# Patient Record
Sex: FEMALE | Race: WHITE | NOT HISPANIC OR LATINO | URBAN - METROPOLITAN AREA
[De-identification: names, ages, dates, MRNs, and addresses within clinical notes are randomized per-mention and may not be internally consistent; named-entity substitution may affect disease eponyms.]

---

## 2018-01-21 ENCOUNTER — EMERGENCY (EMERGENCY)
Facility: HOSPITAL | Age: 38
LOS: 1 days | Discharge: HOME | End: 2018-01-21
Attending: EMERGENCY MEDICINE | Admitting: EMERGENCY MEDICINE
Payer: COMMERCIAL

## 2018-01-21 VITALS
DIASTOLIC BLOOD PRESSURE: 77 MMHG | SYSTOLIC BLOOD PRESSURE: 124 MMHG | HEART RATE: 98 BPM | RESPIRATION RATE: 17 BRPM | TEMPERATURE: 99 F | OXYGEN SATURATION: 98 %

## 2018-01-21 VITALS
WEIGHT: 195.11 LBS | RESPIRATION RATE: 18 BRPM | DIASTOLIC BLOOD PRESSURE: 77 MMHG | TEMPERATURE: 99 F | OXYGEN SATURATION: 97 % | SYSTOLIC BLOOD PRESSURE: 131 MMHG | HEIGHT: 67 IN | HEART RATE: 97 BPM

## 2018-01-21 DIAGNOSIS — Z98.890 OTHER SPECIFIED POSTPROCEDURAL STATES: Chronic | ICD-10-CM

## 2018-01-21 DIAGNOSIS — Z87.19 PERSONAL HISTORY OF OTHER DISEASES OF THE DIGESTIVE SYSTEM: Chronic | ICD-10-CM

## 2018-01-21 DIAGNOSIS — Z98.891 HISTORY OF UTERINE SCAR FROM PREVIOUS SURGERY: Chronic | ICD-10-CM

## 2018-01-21 PROCEDURE — 99284 EMERGENCY DEPT VISIT MOD MDM: CPT

## 2018-01-21 RX ORDER — DIAZEPAM 5 MG
5 TABLET ORAL ONCE
Qty: 0 | Refills: 0 | Status: DISCONTINUED | OUTPATIENT
Start: 2018-01-21 | End: 2018-01-21

## 2018-01-21 RX ORDER — OXYCODONE AND ACETAMINOPHEN 5; 325 MG/1; MG/1
1 TABLET ORAL ONCE
Qty: 0 | Refills: 0 | Status: DISCONTINUED | OUTPATIENT
Start: 2018-01-21 | End: 2018-01-21

## 2018-01-21 RX ORDER — DIAZEPAM 5 MG
1 TABLET ORAL
Qty: 12 | Refills: 0 | OUTPATIENT
Start: 2018-01-21 | End: 2018-01-24

## 2018-01-21 RX ADMIN — Medication 5 MILLIGRAM(S): at 17:49

## 2018-01-21 RX ADMIN — Medication 40 MILLIGRAM(S): at 17:49

## 2018-01-21 RX ADMIN — OXYCODONE AND ACETAMINOPHEN 1 TABLET(S): 5; 325 TABLET ORAL at 17:49

## 2018-01-21 NOTE — ED PROVIDER NOTE - CARE PLAN
Principal Discharge DX:	Acute left-sided low back pain with left-sided sciatica  Assessment and plan of treatment:	Return to the ED for any new or worsening symptoms  Take your medication as prescribed  Valium per label instructions as needed for pain/spasm do not drive when taking  Percocet per label instructions as needed for severe do not drive when taking  Prednisone 1 tab daily begin tomorrow as you had your first dose here   Follow up on Tuesday as scheduled   Advance activity as tolerated  Heating pad to affected sore region on 20 min off 40 min as needed for pain and spasm  Secondary Diagnosis:	Lumbar disc herniation

## 2018-01-21 NOTE — ED PROVIDER NOTE - OBJECTIVE STATEMENT
Pt is a 36 yo female who presents to the ED with a cc of back pain.  PMHx of HTN, lumbar disc herniation.  Pt reports that she has been suffering from chronic low back pain for the last several months.  Denies any acute trauma.  She followed up and had an outpatient MRI preformed approx 1 week ago.  She was told that she had a lumbar disc herniation and has follow up with a specialist on Tuesday.  She is not currently taking any medication for this.  Yesterday she admits to walking for a prolonged period of time but denies any falls or trauma. This morning while getting up from bed she felt a severe pain in her left lower back/gluteal region.  She reports that the pain is sharp and stabbing in nature with radiation down her left leg into her toes.  She reports a tingling sensation in her left leg.  Denies numbness in the leg.  The pain is so severe she cannot lay or sit which is not usual for her.  Pt has not taken anything for the symptoms.  Denies fever, chills, N/V, CP, SOB, abd pain.  Denies loss of bowel or bladder function.  Denies dysuria, urinary frequency or urgency.    LMP 2 weeks ago denies chance of pregnancy

## 2018-01-21 NOTE — ED ADULT NURSE NOTE - OBJECTIVE STATEMENT
Presents to ER w back pain. Pt states she had an MRI 1 week ago and was told she has bulging discs. Pt has tingling in left foot and numbness down the calf.

## 2018-01-21 NOTE — ED PROVIDER NOTE - PLAN OF CARE
Return to the ED for any new or worsening symptoms  Take your medication as prescribed  Valium per label instructions as needed for pain/spasm do not drive when taking  Percocet per label instructions as needed for severe do not drive when taking  Prednisone 1 tab daily begin tomorrow as you had your first dose here   Follow up on Tuesday as scheduled   Advance activity as tolerated  Heating pad to affected sore region on 20 min off 40 min as needed for pain and spasm

## 2018-01-21 NOTE — ED PROVIDER NOTE - PROGRESS NOTE DETAILS
Pt with improvement in symptoms.  All questions answered.  Stable for discharge home with outpatient follow up.  She has appt on Tuesday

## 2018-01-21 NOTE — ED PROVIDER NOTE - MUSCULOSKELETAL, MLM
No midline C/T/L TTP step offs or deformities noted TTP overlying left gluteal region with increased tone noted to left lower lumbar paraspinal region sensation intact to bilateral lower ext 5/5 muscle strength +pedal pulses bilaterally

## 2019-07-17 NOTE — ED ADULT NURSE NOTE - NS ED NURSE LEVEL OF CONSCIOUSNESS MENTAL STATUS
Alb 4.5 3.5 - 5.2 g/dL    Albumin/Globulin Ratio 2.3 1.0 - 2.5    GFR Non-African American >60 >60 mL/min    GFR African American >60 >60 mL/min    GFR Comment          GFR Staging NOT REPORTED    CBC Auto Differential   Result Value Ref Range    WBC 49.6 (HH) 3.5 - 11.0 k/uL    RBC 4.14 4.0 - 5.2 m/uL    Hemoglobin 14.0 12.0 - 16.0 g/dL    Hematocrit 41.7 36 - 46 %    .9 (H) 80 - 100 fL    MCH 33.8 26 - 34 pg    MCHC 33.5 31 - 37 g/dL    RDW 14.6 12.5 - 15.4 %    Platelets 088 (L) 996 - 450 k/uL    MPV 7.2 6.0 - 12.0 fL    NRBC Automated NOT REPORTED per 100 WBC    Differential Type NOT REPORTED     Immature Granulocytes NOT REPORTED 0 %    Absolute Immature Granulocyte NOT REPORTED 0.00 - 0.30 k/uL    WBC Morphology NOT REPORTED     RBC Morphology NOT REPORTED     Platelet Estimate NOT REPORTED     Seg Neutrophils 4 (L) 36 - 66 %    Lymphocytes 95 (H) 24 - 44 %    Monocytes 1 1 - 7 %    Eosinophils % 0 (L) 1 - 4 %    Basophils 0 0 - 2 %    Segs Absolute 1.98 1.8 - 7.7 k/uL    Absolute Lymph # 47.12 (H) 1.0 - 4.8 k/uL    Absolute Mono # 0.50 0.1 - 0.8 k/uL    Absolute Eos # 0.00 0.0 - 0.4 k/uL    Basophils # 0.00 0.0 - 0.2 k/uL    Morphology SMUDGE CELLS PRESENT        IMPRESSION:     1. CLL (chronic lymphocytic leukemia) ([facility] [address]    2. Thrombocytopenia ([facility] [address]        Patient Active Problem List   Diagnosis    Mixed hyperlipidemia    Hypothyroidism    Essential hypertension    CLL (chronic lymphocytic leukemia) (HCC)    History of MRSA infection       PLAN:     1. Reviewed the patient's labs. Specifically, we had an in-depth discussion regarding her CBC. I am a little concerned about the findings. Specifically, her white count and absolute lymphocyte count did rise. However, her platelet count slightly dropped. This is a concern. 2. She is otherwise asymptomatic. We will hold off on initiating any treatment for the moment. However, we will plan to reassess labs in 3 months for surveillance.   3. She Awake/Alert will monitor herself closely and alert me of any changes in her condition. Otherwise, she will return to see me in 3 months or sooner if clinically indicated.       Electronically signed by Randell Jain MD on 7/17/2019 at 6:27 PM Awake/Alert/Cooperative

## 2023-02-21 PROBLEM — Z00.00 ENCOUNTER FOR PREVENTIVE HEALTH EXAMINATION: Status: ACTIVE | Noted: 2023-02-21

## 2023-02-21 PROBLEM — I10 ESSENTIAL (PRIMARY) HYPERTENSION: Chronic | Status: ACTIVE | Noted: 2018-01-21

## 2023-02-24 ENCOUNTER — APPOINTMENT (OUTPATIENT)
Dept: OTOLARYNGOLOGY | Facility: CLINIC | Age: 43
End: 2023-02-24
Payer: COMMERCIAL

## 2023-02-24 VITALS
HEIGHT: 67 IN | DIASTOLIC BLOOD PRESSURE: 104 MMHG | SYSTOLIC BLOOD PRESSURE: 153 MMHG | BODY MASS INDEX: 32.18 KG/M2 | WEIGHT: 205 LBS

## 2023-02-24 DIAGNOSIS — Z86.79 PERSONAL HISTORY OF OTHER DISEASES OF THE CIRCULATORY SYSTEM: ICD-10-CM

## 2023-02-24 DIAGNOSIS — Z83.3 FAMILY HISTORY OF DIABETES MELLITUS: ICD-10-CM

## 2023-02-24 DIAGNOSIS — Z78.9 OTHER SPECIFIED HEALTH STATUS: ICD-10-CM

## 2023-02-24 DIAGNOSIS — Z82.49 FAMILY HISTORY OF ISCHEMIC HEART DISEASE AND OTHER DISEASES OF THE CIRCULATORY SYSTEM: ICD-10-CM

## 2023-02-24 PROCEDURE — 31579 LARYNGOSCOPY TELESCOPIC: CPT

## 2023-02-24 PROCEDURE — 99204 OFFICE O/P NEW MOD 45 MIN: CPT | Mod: 25

## 2023-02-24 RX ORDER — ATENOLOL 50 MG/1
TABLET ORAL
Refills: 0 | Status: ACTIVE | COMMUNITY

## 2023-02-24 NOTE — CONSULT LETTER
[Dear  ___] : Dear  [unfilled], [Consult Letter:] : I had the pleasure of evaluating your patient, [unfilled]. [Please see my note below.] : Please see my note below. [Consult Closing:] : Thank you very much for allowing me to participate in the care of this patient.  If you have any questions, please do not hesitate to contact me. [Sincerely,] : Sincerely, [FreeTextEntry2] : Dr. Dionte Hunteroscia \par [address] [place] [address]\par [phone] [FreeTextEntry1] : Thanks for the referal.  We will plan on doing an in office injection laryngoplasty approx 1 week from now. [FreeTextEntry3] : Tyler Arriola M.D.\par Division of Laryngology | Department of Otolaryngology\par [facility]\[place] [id number]

## 2023-02-24 NOTE — ASSESSMENT
[FreeTextEntry1] : Assessment/Plan: \par After a thorough discussion of our findings today, the patient understands she has an idiopathic left vocal fold paralysis, left vocal fold polyp, and right vocal fold lesion.  I will request the outside CT neck to review and confirm lack of cause of paralysis.  We discussed doing an in office injection laryngoplasty of the left vocal fold in 1 week which she would like to proceed with.  The risks, benefits, and alternatives to care were discussed with the patient and understanding expressed. No need for SLP at this time, may consider in the future.  Continue voice amplifier as needed.\par \par

## 2023-02-24 NOTE — HISTORY OF PRESENT ILLNESS
[de-identified] : RICARDO DODD is a 42 year old woman who presents to the [facility] with difficulty phonating and known left sided vocal fold paralysis. Patient is referred by Dr. Guerra. VF paralysis first diagnosed 3 weeks ago.  Illness was 6 weeks ago, and voice quit 5 days after that suddenly. Currently using an amplifier for work. States he has had mild persistent hoarseness for many years prior to this recent sudden worsening of voice.\par \par States had a cold, went to urgent care and was tested for strep with positive results, treated with 5 days antibiotics and steroid taper, hoarseness started after.  \par Denies complete loss of voice.\par Denies hemoptysis. \par She does not note specific difficulties with swallowing.\par She notes intermittent classic heartburn symptoms- not taking medications for it.\par Denies odynophagia, aspirations, dyspnea, otalgia. \par Denies history of smoking. States social alcohol use. 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

## 2023-02-24 NOTE — PROCEDURE
[de-identified] : Procedure: Transnasal flexible laryngoscopy \par Description: Informed consent was obtained from the patient prior to the procedure. The patient was seated in the clinic chair. Topical anesthesia was achieved by first spraying the nasal cavities with 4% lidocaine and 1% phenylephrine. \par \par Exam: This demonstrates a clear vallecula and crisp epiglottis. The aryepiglottic folds are intact and symmetric bilaterally. The hypopharynx does not demonstrate pooling. The interarytenoid space demonstrates no lesions. It does not demonstrate pachydermia. The false vocal folds are symmetric and without lesions or masses. False fold voicing (plica ventricularis) is not noted today. The right true vocal fold shows normal motion. The left true vocal fold shows absence of mobility and is in the paramedian position. The left vocal fold shows what appears to be a small broad based polyp vs pseudocyst along the mid fold and right side with contralateral small reactive lesion. The mucosal covering is minimally edematous. There is not erythema present today. The vocal processes do not demonstrate granulomas. The subglottis and proximal trachea is clear and unobstructed to the limits of the examination today. There is substantial supraglottic muscle tension on phonation.\par \par Stroboscopic Laryngoscopy Procedure Note: \par Indication:	Assess laryngeal biomechanics and vocal fold oscillation. \par Description of Procedure:	Informed consent was verbally obtained from the patient prior to the procedure. The patient was seated in the clinic chair. Topical anesthesia was achieved by first spraying the nasal cavities with 4% lidocaine and nasal decongestant. \par \par Findings: \par Supraglottis: no masses or lesions \par Glottis:    Structure: \par                       Right: small mid fold reactive lesion vs fibroepithelial lesion \par                       Left:  shows what appears to be a small broad based polyp vs pseudocyst along the mid fold \par                Mobility: \par                       Right:  normal \par                       Left:  absent\par                Amplitude: \par                       Right:  normal\par                       Left:  increased\par                Closure: complete \par                Wave symmetry:  symmetric at most frequencies \par Subglottis: no masses or lesions within the visualized subglottis Visualized airway is widely patent.

## 2023-03-16 ENCOUNTER — APPOINTMENT (OUTPATIENT)
Dept: OTOLARYNGOLOGY | Facility: CLINIC | Age: 43
End: 2023-03-16
Payer: COMMERCIAL

## 2023-03-16 DIAGNOSIS — J38.01 PARALYSIS OF VOCAL CORDS AND LARYNX, UNILATERAL: ICD-10-CM

## 2023-03-16 PROCEDURE — ZZZZZ: CPT

## 2023-03-16 NOTE — CONSULT LETTER
[Dear  ___] : Dear  [unfilled], [Consult Letter:] : I had the pleasure of evaluating your patient, [unfilled]. [Please see my note below.] : Please see my note below. [Consult Closing:] : Thank you very much for allowing me to participate in the care of this patient.  If you have any questions, please do not hesitate to contact me. [Sincerely,] : Sincerely, [FreeTextEntry2] : Dr. Guerra [FreeTextEntry1] : She underwent an in office injection laryngoplasty with me today.  She will now undergo voice therapy.  I will see her back in 3 months for her vocal fold lesions.  I will have her follow up with you for all other ENT needs. Thanks again for referral. [FreeTextEntry3] : Tyler Arriola M.D.\par Division of Laryngology | Department of Otolaryngology \par [facility] \[place] [phone]

## 2023-03-16 NOTE — HISTORY OF PRESENT ILLNESS
[de-identified] : RICARDO DODD is a 42 year old woman who presents to the [facility] with idiopathic left vocal fold paralysis, left vocal fold polyp, and right vocal fold lesion. I last saw the patient on 2/24/23.  At that time we requested her outside CT neck for review and planned for in office injection laryngoplasty. Reports voice is mildly improved from last visit. Again admits that her voice has been hoarse her whole life.  \par \par Previously reported: Patient is referred by Dr. Guerra. VF paralysis first diagnosed 3 weeks ago.  Illness was 6 weeks ago, and voice quit 5 days after that suddenly. Currently using an amplifier for work. States he has had mild persistent hoarseness for many years prior to this recent sudden worsening of voice. States had a cold, went to urgent care and was tested for strep with positive results, treated with 5 days antibiotics and steroid taper, hoarseness started after.  \par Denies complete loss of voice.\par Denies hemoptysis. \par She does not note specific difficulties with swallowing.\par She notes intermittent classic heartburn symptoms- not taking medications for it.\par Denies odynophagia, aspirations, dyspnea, otalgia. \par Denies history of smoking. States social alcohol use. 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

## 2023-03-16 NOTE — ASSESSMENT
[FreeTextEntry1] : Assessment/Plan: \par #1 Bilateral benign mid vocal fold lesions\par #2 Left vocal fold mild hypomobility\par After a thorough discussion of our findings today, the patient understands she has an idiopathic left vocal fold mild hypomobility, left vocal fold polyp, and right vocal fold lesion.  I do not expect she will need any interventions going forward for her left vocal fold hypomobility as I expect she will regain full motion within the next 3 months based on the progress I have seen thus far.  In regards her to vocal fold lesions I have recommended we now start voice therapy.  I will follow up with her in 3 months and depending on how her voice is doing at that time we may discuss option of phonomicrosurgery.  \par \par Total time: 20 minutes; total time does not include time spent performing the procedure and its related elements. It does include all other face to face and non face to face pre and post visit tasks performed on the same date of service.\par \par \par

## 2023-03-16 NOTE — PROCEDURE
[FreeTextEntry3] : Juvederm injection of left vocal fold: \par SURGEON: Tyler Arriola MD \par \par Substance Injected: \par Juvederm Voluma\par \par Amounts and Location Injected: \par 0.2 ml injected into the left paraglottic space \par \par FINDINGS: left sided vocal fold mild hypomobility. bilateral mid vocal fold lesions\par \par NARRATIVE: Ms. DODD was brought to clinic and consented for the procedure. Her nose was topicalized with oxymetazoline nasal spray and lidocaine.  After topicalization, a surgical pause was held to confirm correct patient, procedure, site, allergies, equipment and position. Using a 27 gauge needle, approximately 2 cc of 2% lidocaine in 1:100,000 epinephrine was injected subcutaneously at the thyroid prominence. \par \par A flexible distal-chip videolaryngoscope was introduced into the left nare. The scope was positioned above the epiglottis. Approximately 5 ml of 4% lidocaine was dripped onto the vocal cords through the working channel of the scope. This produced a strong cough, anesthetizing the vocal folds. After allowing several minutes for analgesia, the 1 ml of Juvederm on a 25 gauge needle (that was bent appropriately) was inserted into the the skin that had previously been anesthetized just superior to the thyroid notch. The tip of the needle was seen tenting up the mucosa of the petiole just above the anterior commissure. The needle then pierced through this mucosa and was advanced and inserted laterally into the posterior 1/3 of the left vocal fold. Approximately 0.2 cc of Juvederm was injected. The patient was asked to phonate and they had a squeezed quality as aimed for. The vocal fold had been pushed slightly past medial position as intended. The needle and flexible laryngoscopes were removed. The patient tolerated the procedure well. \par \par DISPOSITION: Ms. DODD was discharged with instructions not to eat or drink for about 30 minutes to allow the topical anesthesia to wear off.

## 2023-06-05 ENCOUNTER — APPOINTMENT (OUTPATIENT)
Dept: OTOLARYNGOLOGY | Facility: CLINIC | Age: 43
End: 2023-06-05
Payer: COMMERCIAL

## 2023-06-05 VITALS — DIASTOLIC BLOOD PRESSURE: 119 MMHG | SYSTOLIC BLOOD PRESSURE: 171 MMHG

## 2023-06-05 DIAGNOSIS — J38.3 OTHER DISEASES OF VOCAL CORDS: ICD-10-CM

## 2023-06-05 DIAGNOSIS — R49.0 DYSPHONIA: ICD-10-CM

## 2023-06-05 DIAGNOSIS — J38.1 POLYP OF VOCAL CORD AND LARYNX: ICD-10-CM

## 2023-06-05 PROCEDURE — 99213 OFFICE O/P EST LOW 20 MIN: CPT | Mod: 25

## 2023-06-05 PROCEDURE — 31579 LARYNGOSCOPY TELESCOPIC: CPT

## 2023-06-05 NOTE — PROCEDURE
[de-identified] : Stroboscopic Laryngoscopy Procedure Note: \par Indication:	Assess laryngeal biomechanics and vocal fold oscillation. \par Description of Procedure:	Informed consent was verbally obtained from the patient prior to the procedure. The patient was seated in the clinic chair. Topical anesthesia was achieved by first spraying the nasal cavities with 4% lidocaine and nasal decongestant. \par \par Findings: \par Supraglottis: no masses or lesions \par Glottis:    Structure: \par                       Right: crisp and shows no lesions or masses \par                       Left:  small mid fold polyp and moderate sized intracordal cyst\par                Mobility: \par                       Right:  normal \par                       Left:  normal\par                Amplitude: \par                       Right:  normal\par                       Left:  decreased\par                Closure: complete \par                Wave symmetry:  asymmetric \par Subglottis: no masses or lesions within the visualized subglottis Visualized airway is widely patent.

## 2023-06-05 NOTE — HISTORY OF PRESENT ILLNESS
[de-identified] : RICARDO DODD is a 42 year old woman who presents to the [facility] with idiopathic left vocal fold hypomobility, left vocal fold polyp, and right vocal fold lesion. I first saw the patient on 2/24/23 and last saw her on 3/16/23.  Again admits that her voice has been hoarse her whole life.  Plan at that time was voice therapy and follow up in 3 months.\par Did not start voice therapy - she forgot about it. \par She believes her voice is back to baseline from last visit - she is not straining and does not lose it during the day\par \par Previously reported: Patient is referred by Dr. Guerra. VF paralysis first diagnosed 3 weeks ago.  Illness was 6 weeks ago, and voice quit 5 days after that suddenly. Currently using an amplifier for work. States he has had mild persistent hoarseness for many years prior to this recent sudden worsening of voice. States had a cold, went to urgent care and was tested for strep with positive results, treated with 5 days antibiotics and steroid taper, hoarseness started after.  \par Denies complete loss of voice.\par Denies hemoptysis. \par She does not note specific difficulties with swallowing.\par She notes intermittent classic heartburn symptoms- not taking medications for it.\par Denies odynophagia, aspirations, dyspnea, otalgia. \par Denies history of smoking. States social alcohol use. 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

## 2023-06-05 NOTE — ASSESSMENT
[FreeTextEntry1] : Assessment/Plan: \par #1 Left vocal fold polyp\par #2 Left vocal fold polyp- small\par #3 Dysphonia\par \par After a thorough discussion of our findings I gave the patient the following options:\par 1) Observation\par 2) Voice therapy alone\par 3) Voice therapy plus surgery.  Surgery would involve direct laryngoscopy with excision of vocal fold lesion(s) with microflap, possible laryngeal biopsy, injection of steroid in right and/or left vocal fold(s), and examination of their trachea and mainstem bronchi.  The risks include but are not limited to damage to the teeth, vocal fold scarring, and a worse voice.  They would see a speech language pathologist prior to surgery and at least once after surgery at approximately the 1 month post-op arnaldo.  They would see me post operatively at 1 week and 1 month.  They would be expected to have strict voice rest 5 days after surgery and moderate voice use for the 2 weeks following that.\par \par She elected to proceed with option #1 which makes perfect sense since her current voice is the one she has always known. \par \par She may follow up PRN.\par

## 2023-12-28 NOTE — ED PROVIDER NOTE - RELIEVING FACTORS
Julianne Loving planned on calling in an outpatient prescription for Compazine to your pharmacy.    After receiving lab results, provider NANCY Armstrong wishes for you to present to the ER, preferably [facility] for evaluation.   see above